# Patient Record
Sex: FEMALE | Race: WHITE
[De-identification: names, ages, dates, MRNs, and addresses within clinical notes are randomized per-mention and may not be internally consistent; named-entity substitution may affect disease eponyms.]

---

## 2019-04-12 ENCOUNTER — APPOINTMENT (OUTPATIENT)
Dept: COLORECTAL SURGERY | Facility: CLINIC | Age: 39
End: 2019-04-12
Payer: MEDICAID

## 2019-04-12 VITALS
SYSTOLIC BLOOD PRESSURE: 128 MMHG | WEIGHT: 110 LBS | HEIGHT: 60 IN | DIASTOLIC BLOOD PRESSURE: 86 MMHG | RESPIRATION RATE: 15 BRPM | BODY MASS INDEX: 21.6 KG/M2 | OXYGEN SATURATION: 99 % | HEART RATE: 68 BPM

## 2019-04-12 VITALS
OXYGEN SATURATION: 99 % | RESPIRATION RATE: 15 BRPM | WEIGHT: 110 LBS | SYSTOLIC BLOOD PRESSURE: 128 MMHG | HEIGHT: 60 IN | BODY MASS INDEX: 21.6 KG/M2 | HEART RATE: 68 BPM | DIASTOLIC BLOOD PRESSURE: 86 MMHG

## 2019-04-12 DIAGNOSIS — N80.5 ENDOMETRIOSIS OF INTESTINE: ICD-10-CM

## 2019-04-12 DIAGNOSIS — R19.8 OTHER SPECIFIED SYMPTOMS AND SIGNS INVOLVING THE DIGESTIVE SYSTEM AND ABDOMEN: ICD-10-CM

## 2019-04-12 DIAGNOSIS — Z87.19 PERSONAL HISTORY OF OTHER DISEASES OF THE DIGESTIVE SYSTEM: ICD-10-CM

## 2019-04-12 DIAGNOSIS — Z80.0 FAMILY HISTORY OF MALIGNANT NEOPLASM OF DIGESTIVE ORGANS: ICD-10-CM

## 2019-04-12 PROBLEM — Z00.00 ENCOUNTER FOR PREVENTIVE HEALTH EXAMINATION: Status: ACTIVE | Noted: 2019-04-12

## 2019-04-12 PROCEDURE — 45300 PROCTOSIGMOIDOSCOPY DX: CPT

## 2019-04-12 PROCEDURE — 99203 OFFICE O/P NEW LOW 30 MIN: CPT | Mod: 25

## 2019-04-12 RX ORDER — FLUTICASONE PROPIONATE 50 UG/1
50 SPRAY, METERED NASAL
Refills: 0 | Status: ACTIVE | COMMUNITY

## 2019-04-12 RX ORDER — ALBUTEROL 90 MCG
90 AEROSOL (GRAM) INHALATION
Refills: 0 | Status: ACTIVE | COMMUNITY

## 2019-04-12 RX ORDER — YOHIMBE BARK 500 MG
CAPSULE ORAL
Refills: 0 | Status: ACTIVE | COMMUNITY

## 2019-04-12 RX ORDER — MONTELUKAST SODIUM 10 MG/1
TABLET, FILM COATED ORAL
Refills: 0 | Status: ACTIVE | COMMUNITY

## 2019-04-12 RX ORDER — AZELASTINE HYDROCHLORIDE 137 UG/1
0.1 SPRAY, METERED NASAL
Refills: 0 | Status: ACTIVE | COMMUNITY

## 2019-04-12 NOTE — HISTORY OF PRESENT ILLNESS
[FreeTextEntry1] : Patient is a 37 yo female here with her father to discuss rectal endometriosis.  She states that she is in constant pain, and is having 10 loose bowel movements a day.  She also has nausea all the time.  She has had many surgical procedures including large and small bowel resections.\par \par Her current complaints are of constant nausea, abdominal pressure, rectal pressure and overall "not feeling well". She also reports 10 BMs per day that alternate between formed and loose.\par She had a laparoscopy with excision of pelvic endometriosis by Dr. Milly Peralta on 2/11/19\par Her last colonoscopy was with Dr. Lechuga within the past 2 years. \par

## 2019-04-12 NOTE — REVIEW OF SYSTEMS
[Feeling Poorly] : feeling poorly [Wheezing] : wheezing [Shortness Of Breath] : shortness of breath [Abdominal Pain] : abdominal pain [Diarrhea] : diarrhea [Dysuria] : dysuria [Joint Pain] : joint pain [Itching] : itching [Dizziness] : dizziness [Anxiety] : anxiety [Depression] : depression [Negative] : Heme/Lymph [FreeTextEntry3] : h/o cellulitis of the eyes [FreeTextEntry4] : a [FreeTextEntry6] : Asthma [FreeTextEntry9] : neck and back pain [de-identified] : Eczema [FreeTextEntry1] : Alpha Gal tick disease

## 2019-04-12 NOTE — ASSESSMENT
[FreeTextEntry1] : The patient has a complicated history. Upon review of the images from her recent laparoscopy, the lesion on her rectum is quite small (appears to be only a few mm in size) and I highly doubt that this is causing her symptoms. \par \par I asked her to please obtain the records from her most recent CT scan, colonoscopy and surgical records. \par She says that her CT was without oral contrast so it may be beneficial to repeat with PO contrast to see if there is any element of intermittent/partial SBO from adhesions. \par Will f/u once all records are obtained.\par Also left messages for Lizzeth Peralta and Lisa (GI) to call to discuss her case. \par

## 2019-04-12 NOTE — PHYSICAL EXAM
[Wheezing] : wheezing was heard [Normal Heart Sounds] : normal heart sounds [No Rash or Lesion] : No rash or lesion [Alert] : alert [Oriented to Person] : oriented to person [Oriented to Place] : oriented to place [Anxious] : anxious [Oriented to Time] : oriented to time [Normal] : was normal [None] : there was no rectal mass  [Excoriation] : no perianal excoriation [Fistula] : no fistulas [Wart] : no warts [Ulcer ___ cm] : no ulcers [de-identified] : soft,  [de-identified] : rigid sig up to about 8cm reveals no proctitis, no gross lesions [de-identified] : well nourished female [de-identified] : SAAD/+ROM [de-identified] : NC/AT [de-identified] : Intact

## 2019-04-12 NOTE — CONSULT LETTER
[Dear  ___] : Dear  [unfilled], [Courtesy Letter:] : I had the pleasure of seeing your patient, [unfilled], in my office today. [Please see my note below.] : Please see my note below. [Consult Closing:] : Thank you very much for allowing me to participate in the care of this patient.  If you have any questions, please do not hesitate to contact me. [Sincerely,] : Sincerely, [DrHermes  ___] : Dr. RAGSDALE [FreeTextEntry3] : Stanislaw Ralph MD, FACS, FASCRS\par Colorectal Surgery\par [facility]\par Assistant Professor of Surgery [employer] [facility]\par [address] [place] [address], [address]\[place] [address]\par Tel: [phone] \par Cell: [phone] \par Email: [email or web address]  [DrHermes ___] : Dr. RAGSDALE

## 2019-04-12 NOTE — REASON FOR VISIT
[Consultation] : a consultation visit [Parent] : parent [FreeTextEntry1] : PAtient intake forms reviewed

## 2020-04-28 ENCOUNTER — APPOINTMENT (OUTPATIENT)
Dept: CARDIOLOGY | Facility: CLINIC | Age: 40
End: 2020-04-28
Payer: MEDICAID

## 2020-04-28 ENCOUNTER — NON-APPOINTMENT (OUTPATIENT)
Age: 40
End: 2020-04-28

## 2020-04-28 VITALS
HEART RATE: 67 BPM | OXYGEN SATURATION: 97 % | SYSTOLIC BLOOD PRESSURE: 128 MMHG | HEIGHT: 60 IN | BODY MASS INDEX: 22.38 KG/M2 | WEIGHT: 114 LBS | DIASTOLIC BLOOD PRESSURE: 72 MMHG

## 2020-04-28 DIAGNOSIS — Z87.898 PERSONAL HISTORY OF OTHER SPECIFIED CONDITIONS: ICD-10-CM

## 2020-04-28 DIAGNOSIS — F17.200 NICOTINE DEPENDENCE, UNSPECIFIED, UNCOMPLICATED: ICD-10-CM

## 2020-04-28 DIAGNOSIS — D64.9 ANEMIA, UNSPECIFIED: ICD-10-CM

## 2020-04-28 DIAGNOSIS — Z87.891 PERSONAL HISTORY OF NICOTINE DEPENDENCE: ICD-10-CM

## 2020-04-28 DIAGNOSIS — J45.20 MILD INTERMITTENT ASTHMA, UNCOMPLICATED: ICD-10-CM

## 2020-04-28 DIAGNOSIS — Z87.39 PERSONAL HISTORY OF OTHER DISEASES OF THE MUSCULOSKELETAL SYSTEM AND CONNECTIVE TISSUE: ICD-10-CM

## 2020-04-28 PROCEDURE — 93000 ELECTROCARDIOGRAM COMPLETE: CPT

## 2020-04-28 PROCEDURE — 0296T: CPT

## 2020-04-28 PROCEDURE — 99203 OFFICE O/P NEW LOW 30 MIN: CPT

## 2020-04-28 RX ORDER — FLUTICASONE FUROATE 100 UG/1
100 POWDER RESPIRATORY (INHALATION) DAILY
Refills: 0 | Status: ACTIVE | COMMUNITY

## 2020-04-28 RX ORDER — BUPROPION HYDROCHLORIDE 150 MG/1
150 TABLET, EXTENDED RELEASE ORAL DAILY
Refills: 0 | Status: ACTIVE | COMMUNITY

## 2020-04-28 NOTE — ASSESSMENT
[FreeTextEntry1] : MARIE ROGERS is a 39 year old F who presents today Apr 28, 2020 with the above history and the following active issues: \par \par Intermittent chest pain/pressure. Palpitations. \par Symptoms mostly exertional and self resolving. \par Symptom free during office visit today. \par CRF include FHx. \par EKG shows TWI V2 otherwise no acute changes. \par Obtain 2d echocardiogram to evaluate resting heart structure, valvular function, and LVEF. \par Obtain Zio patch event monitor to r/o underlying dysrhythmias. \par Discussed ischemic evaluation, including options of nuclear perfusion imaging vs exercise treadmill testing. \par Given the nature of symptoms would prefer exercise testing to r/o inducible arrhythmias or ischemia. This will be done as soon as risk of cross contamination of COVID has declined. \par Will call her with results of above testing. \par Avoid strenuous activity until testing is complete. \par \par Requesting labs done at PCP office. \par Reviewed red flag symptoms which would warrant emergent medical evaluation. \par Any questions and concerns were addressed and resolved. \par \par Sincerely,\par \par SERAFIN Kaiser\par Patients history, testing, and plan reviewed with supervising MD: Dr. Sriram Baugh

## 2020-04-28 NOTE — REVIEW OF SYSTEMS
[Fever] : no fever [Chills] : no chills [Feeling Fatigued] : feeling fatigued [see HPI] : see HPI [Chest  Pressure] : chest pressure [Chest Pain] : chest pain [Palpitations] : palpitations [Cough] : no cough [Wheezing] : no wheezing [Joint Pain] : joint pain [Anxiety] : anxiety [Muscle Cramps] : muscle cramps [Under Stress] : under stress [Negative] : Heme/Lymph

## 2020-04-28 NOTE — PHYSICAL EXAM
[General Appearance - Well Developed] : well developed [Normal Appearance] : normal appearance [Well Groomed] : well groomed [General Appearance - Well Nourished] : well nourished [No Deformities] : no deformities [General Appearance - In No Acute Distress] : no acute distress [Normal Conjunctiva] : the conjunctiva exhibited no abnormalities [Eyelids - No Xanthelasma] : the eyelids demonstrated no xanthelasmas [FreeTextEntry1] : grossly normal  [Respiration, Rhythm And Depth] : normal respiratory rhythm and effort [Exaggerated Use Of Accessory Muscles For Inspiration] : no accessory muscle use [Auscultation Breath Sounds / Voice Sounds] : lungs were clear to auscultation bilaterally [Heart Rate And Rhythm] : heart rate and rhythm were normal [Heart Sounds] : normal S1 and S2 [Murmurs] : no murmurs present [Edema] : no peripheral edema present [Abdomen Tenderness] : non-tender [Abdomen Soft] : soft [Abnormal Walk] : normal gait [Gait - Sufficient For Exercise Testing] : the gait was sufficient for exercise testing [Cyanosis, Localized] : no localized cyanosis [Skin Color & Pigmentation] : normal skin color and pigmentation [] : no rash [Oriented To Time, Place, And Person] : oriented to person, place, and time [Affect] : the affect was normal [Mood] : the mood was normal

## 2020-04-28 NOTE — REASON FOR VISIT
[Consultation] : a consultation regarding [Chest Pain] : chest pain [Palpitations] : palpitations [FreeTextEntry1] : 39 F presents today for consultation for chest pain and palpitations, referred by Dr. Brown.\par \par PMH of asthma, FHx CAD, endometriosis, fibromyalgia, alpha gal meat allergy, anemia.  Mother had valve surgery, unknown. Former smoker. \par No prior cardiac eval. No history of AF, CHF, CAD, MI, diabetes, HTN, or CVA. \par \par Since November patient has been experiencing intermittent chest pain and palpitations. She had the flu in Jan and since then symptoms progressive, especially over the past week for unknown reason. She did not start any new medications at that time. Rarely uses albuterol. CXR NAD and COVID testing negative according to her at PCP. Symptoms occur when she is active, either at the gym or cleaning, starts as heart racing then left sided CP radiating around to back. Symptoms last minutes and resolve with rest on their own. At its worst 10/10. Occurring on a daily basis and also at times occurring on the right side as well. Denies any associated dizziness, diaphoresis, dyspnea, or syncope. \par \par EKG today 4/28/2020 ordered and interpreted by me reveals normal sinus rhythm with voltage s/o LVH, likely normal variant, TWI V2, otherwise unremarkable.

## 2020-05-15 ENCOUNTER — APPOINTMENT (OUTPATIENT)
Dept: CARDIOLOGY | Facility: CLINIC | Age: 40
End: 2020-05-15
Payer: MEDICAID

## 2020-05-15 PROCEDURE — 93306 TTE W/DOPPLER COMPLETE: CPT

## 2020-05-22 ENCOUNTER — APPOINTMENT (OUTPATIENT)
Dept: CARDIOLOGY | Facility: CLINIC | Age: 40
End: 2020-05-22
Payer: MEDICAID

## 2020-05-22 VITALS — WEIGHT: 112 LBS | TEMPERATURE: 96.7 F | BODY MASS INDEX: 21.87 KG/M2

## 2020-05-22 DIAGNOSIS — R00.2 PALPITATIONS: ICD-10-CM

## 2020-05-22 PROCEDURE — 0298T: CPT

## 2020-05-22 PROCEDURE — 99442: CPT

## 2020-05-22 RX ORDER — LORATADINE 5 MG/5 ML
10 SOLUTION, ORAL ORAL DAILY
Refills: 0 | Status: ACTIVE | COMMUNITY

## 2020-05-22 RX ORDER — ALPRAZOLAM 0.5 MG/1
0.5 TABLET ORAL
Refills: 0 | Status: ACTIVE | COMMUNITY

## 2020-05-22 NOTE — REVIEW OF SYSTEMS
[Feeling Fatigued] : feeling fatigued [see HPI] : see HPI [Chest Pain] : chest pain [Chest  Pressure] : chest pressure [Palpitations] : palpitations [Joint Pain] : joint pain [Anxiety] : anxiety [Muscle Cramps] : muscle cramps [Under Stress] : under stress [Negative] : Heme/Lymph [Fever] : no fever [Chills] : no chills [Cough] : no cough [Wheezing] : no wheezing

## 2020-05-22 NOTE — REASON FOR VISIT
[Follow-Up - Clinic] : a clinic follow-up of [FreeTextEntry2] : review results [FreeTextEntry1] : \par 39 F initially seen for chest pain and palpitations, referred by Dr. Brown.\par \par PMH of asthma, FHx CAD, endometriosis, fibromyalgia, alpha gal meat allergy, anemia.  Mother had valve surgery, unknown. Former smoker. \par No prior cardiac eval. No history of AF, CHF, CAD, MI, diabetes, HTN, or CVA. \par \par Since November patient has been experiencing intermittent chest pain and palpitations. She had the flu in Jan and since then symptoms progressive, especially over the past week for unknown reason. She did not start any new medications at that time. Rarely uses albuterol. CXR NAD and COVID testing negative according to her at PCP. Symptoms occur when she is active, either at the gym or cleaning, starts as heart racing then left sided CP radiating around to back. Symptoms last minutes and resolve with rest on their own. At its worst 10/10. Occurring on a daily basis and also at times occurring on the right side as well. Denies any associated dizziness, diaphoresis, dyspnea, or syncope. \par \par Labs 1/*6/2020 revealed no anemia, normal electrolytes and thyroid fns\par \par EKG  4/28/2020 ordered and interpreted by me reveals normal sinus rhythm with voltage s/o LVH, likely normal variant, TWI V2, otherwise unremarkable. \par \par Reviewed today:\par Echocardiogram 5/15/2020 EF 55%. No pericardial disease. No significant VHD. \par Event monitor x 12 days revealed no significant dysrhythmias. Rare APCs/PVCs noted. Symptom triggers were a/w NSR/artifact.

## 2020-05-22 NOTE — ASSESSMENT
[FreeTextEntry1] : MARIE ROGERS is a 39 year old F who presents today May 22, 2020 with the above history and the following active issues: \par \par Intermittent chest pain/pressure. Palpitations. \par Symptoms mostly exertional and self resolving. \par Symptom free today.\par CRF include FHx. \par EKG shows TWI V2 otherwise no acute changes. \par Echo revealed normal resting heart structure and valvular fns. No pericardial disease. Event monitor x 12 days revealed no underlying dysrhythmias. Symptoms were a/w NSR. \par Discussed ischemic evaluation, including options of nuclear perfusion imaging vs exercise treadmill testing. \par Given the nature of symptoms would prefer exercise testing to r/o inducible arrhythmias or ischemia. This will be done as soon as risk of cross contamination of COVID has declined. \par Avoid strenuous activity until testing is complete. \par \par Of note, pt had normal CBC and TSH and electrolytes. \par \par I believe the etiology of her symptoms may also be a/w uncontrolled asthma and frequent albuterol use. Strongly recommend F/U with allergist and pulmonology for further recommendations in the interim. \par \par Any questions and concerns were addressed and resolved. \par Reviewed red flag symptoms which would warrant emergent medical evaluation. \par \par Sincerely,\par \par SERAFIN Kaiser\par Patients history, testing, and plan reviewed with supervising MD: Dr. Benjamin Ferrara

## 2020-05-22 NOTE — HISTORY OF PRESENT ILLNESS
[Home] : at home, [unfilled] , at the time of the visit. [Medical Office: ([facility])___] : at the medical office located in  [Verbal consent obtained from patient] : the patient, [unfilled] [FreeTextEntry1] : Time of initiation of visit 9:05 am

## 2020-06-29 ENCOUNTER — APPOINTMENT (OUTPATIENT)
Dept: CARDIOLOGY | Facility: CLINIC | Age: 40
End: 2020-06-29
Payer: MEDICAID

## 2020-06-29 DIAGNOSIS — R94.39 ABNORMAL RESULT OF OTHER CARDIOVASCULAR FUNCTION STUDY: ICD-10-CM

## 2020-06-29 DIAGNOSIS — R07.9 CHEST PAIN, UNSPECIFIED: ICD-10-CM

## 2020-06-29 PROCEDURE — 93015 CV STRESS TEST SUPVJ I&R: CPT

## 2020-08-13 ENCOUNTER — APPOINTMENT (OUTPATIENT)
Dept: CARDIOLOGY | Facility: CLINIC | Age: 40
End: 2020-08-13
Payer: MEDICAID

## 2020-08-13 PROCEDURE — 93351 STRESS TTE COMPLETE: CPT

## 2020-08-31 ENCOUNTER — APPOINTMENT (OUTPATIENT)
Dept: CARDIOLOGY | Facility: CLINIC | Age: 40
End: 2020-08-31

## 2020-10-12 ENCOUNTER — APPOINTMENT (OUTPATIENT)
Dept: MRI IMAGING | Facility: CLINIC | Age: 40
End: 2020-10-12
Payer: MEDICAID

## 2020-10-12 PROCEDURE — 72146 MRI CHEST SPINE W/O DYE: CPT

## 2020-10-12 PROCEDURE — 72148 MRI LUMBAR SPINE W/O DYE: CPT

## 2021-09-23 ENCOUNTER — APPOINTMENT (OUTPATIENT)
Dept: GYNECOLOGIC ONCOLOGY | Facility: CLINIC | Age: 41
End: 2021-09-23

## 2024-05-13 ENCOUNTER — NON-APPOINTMENT (OUTPATIENT)
Age: 44
End: 2024-05-13

## 2024-06-04 ENCOUNTER — NON-APPOINTMENT (OUTPATIENT)
Age: 44
End: 2024-06-04

## 2024-12-20 ENCOUNTER — OFFICE (OUTPATIENT)
Dept: URBAN - METROPOLITAN AREA CLINIC 97 | Facility: CLINIC | Age: 44
Setting detail: OPHTHALMOLOGY
End: 2024-12-20
Payer: MEDICAID

## 2024-12-20 ENCOUNTER — RX ONLY (RX ONLY)
Age: 44
End: 2024-12-20

## 2024-12-20 DIAGNOSIS — H01.004: ICD-10-CM

## 2024-12-20 DIAGNOSIS — H40.013: ICD-10-CM

## 2024-12-20 DIAGNOSIS — H52.4: ICD-10-CM

## 2024-12-20 DIAGNOSIS — H10.45: ICD-10-CM

## 2024-12-20 DIAGNOSIS — H01.001: ICD-10-CM

## 2024-12-20 DIAGNOSIS — H16.221: ICD-10-CM

## 2024-12-20 DIAGNOSIS — H25.13: ICD-10-CM

## 2024-12-20 DIAGNOSIS — D31.02: ICD-10-CM

## 2024-12-20 PROCEDURE — 92004 COMPRE OPH EXAM NEW PT 1/>: CPT | Performed by: OPHTHALMOLOGY

## 2024-12-20 PROCEDURE — 92133 CPTRZD OPH DX IMG PST SGM ON: CPT | Performed by: OPHTHALMOLOGY

## 2024-12-20 PROCEDURE — 92015 DETERMINE REFRACTIVE STATE: CPT | Performed by: OPHTHALMOLOGY

## 2024-12-20 ASSESSMENT — REFRACTION_MANIFEST
OS_VA1: 20/20
OS_SPHERE: -0.75
OD_CYLINDER: +1.00
OS_AXIS: 035
OD_VA1: 20/20
OS_ADD: +1.50
OS_CYLINDER: +0.50
OD_CYLINDER: +1.00
OD_SPHERE: PLANO
OS_VA1: 20/20
OD_ADD: +1.50
OD_SPHERE: -0.50
OS_SPHERE: -0.25
OU_VA: 20/20
OS_VA2: 20/25(J1)
OD_AXIS: 169
OS_VA2: 20/25(J1)
OD_VA1: 20/20-
OD_ADD: +1.50
OS_VA1: 20/20
OS_AXIS: 035
OS_VA2: 20/25(J1)
OD_AXIS: 170
OU_VA: 20/20
OS_SPHERE: +0.25
OU_VA: 20/20
OD_ADD: +1.50
OD_VA2: 20/25(J1)
OD_CYLINDER: +1.00
OD_VA2: 20/25(J1)
OD_VA1: 20/20
OS_CYLINDER: +0.50
OS_CYLINDER: +0.50
OS_AXIS: 035
OD_VA2: 20/25(J1)
OD_SPHERE: -1.00
OD_AXIS: 170

## 2024-12-20 ASSESSMENT — VISUAL ACUITY
OS_BCVA: 20/20-
OD_BCVA: 20/20

## 2024-12-20 ASSESSMENT — REFRACTION_AUTOREFRACTION
OS_SPHERE: -0.25
OD_SPHERE: -0.50
OS_CYLINDER: +0.75
OD_AXIS: 169
OD_CYLINDER: +1.00
OS_AXIS: 034

## 2024-12-20 ASSESSMENT — PACHYMETRY
OD_CT_CORRECTION: 2
OS_CT_CORRECTION: 2
OD_CT_UM: 516
OS_CT_UM: 511

## 2024-12-20 ASSESSMENT — KERATOMETRY
METHOD_AUTO_MANUAL: AUTO
OD_K2POWER_DIOPTERS: 46.75
OS_AXISANGLE_DEGREES: 060
OD_AXISANGLE_DEGREES: 130
OD_K1POWER_DIOPTERS: 46.50
OS_K2POWER_DIOPTERS: 46.75
OS_K1POWER_DIOPTERS: 46.00

## 2024-12-20 ASSESSMENT — LID EXAM ASSESSMENTS
OS_BLEPHARITIS: T
OD_BLEPHARITIS: T

## 2024-12-20 ASSESSMENT — CONFRONTATIONAL VISUAL FIELD TEST (CVF)
OS_FINDINGS: FULL
OD_FINDINGS: FULL

## 2024-12-20 ASSESSMENT — SUPERFICIAL PUNCTATE KERATITIS (SPK): OD_SPK: 2+

## 2025-01-07 ENCOUNTER — OFFICE (OUTPATIENT)
Dept: URBAN - METROPOLITAN AREA CLINIC 38 | Facility: CLINIC | Age: 45
Setting detail: OPHTHALMOLOGY
End: 2025-01-07
Payer: MEDICAID

## 2025-01-07 DIAGNOSIS — H83.3X3: ICD-10-CM

## 2025-01-07 DIAGNOSIS — H90.3: ICD-10-CM

## 2025-01-07 DIAGNOSIS — H93.13: ICD-10-CM

## 2025-01-07 PROCEDURE — 92557 COMPREHENSIVE HEARING TEST: CPT | Performed by: AUDIOLOGIST

## 2025-01-07 PROCEDURE — 92567 TYMPANOMETRY: CPT | Performed by: AUDIOLOGIST

## 2025-01-07 PROCEDURE — V5261 HEARING AID, DIGIT, BIN, BTE: HCPCS | Performed by: AUDIOLOGIST

## 2025-05-27 ENCOUNTER — OFFICE (OUTPATIENT)
Dept: URBAN - METROPOLITAN AREA CLINIC 38 | Facility: CLINIC | Age: 45
Setting detail: OPHTHALMOLOGY
End: 2025-05-27

## 2025-05-27 DIAGNOSIS — H90.3: ICD-10-CM

## 2025-05-27 PROCEDURE — HAD HEARING AID DISPENSE: Performed by: AUDIOLOGIST

## 2025-06-10 ENCOUNTER — OFFICE (OUTPATIENT)
Dept: URBAN - METROPOLITAN AREA CLINIC 38 | Facility: CLINIC | Age: 45
Setting detail: OPHTHALMOLOGY
End: 2025-06-10

## 2025-06-10 DIAGNOSIS — H90.3: ICD-10-CM

## 2025-06-10 PROCEDURE — 92593 HEARING AID CHECK; BINAURAL: CPT | Performed by: AUDIOLOGIST

## 2025-07-15 ENCOUNTER — OFFICE (OUTPATIENT)
Dept: URBAN - METROPOLITAN AREA CLINIC 38 | Facility: CLINIC | Age: 45
Setting detail: OPHTHALMOLOGY
End: 2025-07-15

## 2025-07-15 DIAGNOSIS — H90.3: ICD-10-CM

## 2025-07-15 PROCEDURE — 92593 HEARING AID CHECK; BINAURAL: CPT | Performed by: AUDIOLOGIST
